# Patient Record
Sex: FEMALE | Race: OTHER | Employment: UNEMPLOYED | URBAN - METROPOLITAN AREA
[De-identification: names, ages, dates, MRNs, and addresses within clinical notes are randomized per-mention and may not be internally consistent; named-entity substitution may affect disease eponyms.]

---

## 2017-01-17 ENCOUNTER — OFFICE VISIT (OUTPATIENT)
Dept: PSYCHIATRY | Age: 27
End: 2017-01-17

## 2017-01-17 VITALS
DIASTOLIC BLOOD PRESSURE: 60 MMHG | BODY MASS INDEX: 25.58 KG/M2 | OXYGEN SATURATION: 98 % | HEART RATE: 79 BPM | SYSTOLIC BLOOD PRESSURE: 90 MMHG | WEIGHT: 163 LBS | HEIGHT: 67 IN

## 2017-01-17 DIAGNOSIS — F33.0 MILD EPISODE OF RECURRENT MAJOR DEPRESSIVE DISORDER (HCC): Primary | ICD-10-CM

## 2017-01-17 PROCEDURE — 99214 OFFICE O/P EST MOD 30 MIN: CPT | Performed by: PSYCHIATRY & NEUROLOGY

## 2017-01-17 ASSESSMENT — ENCOUNTER SYMPTOMS
RESPIRATORY NEGATIVE: 1
EYES NEGATIVE: 1
GASTROINTESTINAL NEGATIVE: 1

## 2017-08-29 ENCOUNTER — OFFICE VISIT (OUTPATIENT)
Dept: PSYCHIATRY | Age: 27
End: 2017-08-29

## 2017-08-29 VITALS
DIASTOLIC BLOOD PRESSURE: 60 MMHG | WEIGHT: 159 LBS | BODY MASS INDEX: 24.96 KG/M2 | OXYGEN SATURATION: 99 % | HEART RATE: 55 BPM | SYSTOLIC BLOOD PRESSURE: 120 MMHG | HEIGHT: 67 IN

## 2017-08-29 DIAGNOSIS — F12.10 CANNABIS ABUSE: ICD-10-CM

## 2017-08-29 DIAGNOSIS — F33.0 MILD EPISODE OF RECURRENT MAJOR DEPRESSIVE DISORDER (HCC): Primary | ICD-10-CM

## 2017-08-29 PROCEDURE — 99214 OFFICE O/P EST MOD 30 MIN: CPT | Performed by: PSYCHIATRY & NEUROLOGY

## 2017-08-29 RX ORDER — ESCITALOPRAM OXALATE 10 MG/1
10 TABLET ORAL DAILY
Qty: 30 TABLET | Refills: 2 | Status: SHIPPED | OUTPATIENT
Start: 2017-08-29 | End: 2017-09-26

## 2017-08-30 ASSESSMENT — ENCOUNTER SYMPTOMS
GASTROINTESTINAL NEGATIVE: 1
RESPIRATORY NEGATIVE: 1
EYES NEGATIVE: 1

## 2017-09-26 ENCOUNTER — OFFICE VISIT (OUTPATIENT)
Dept: PSYCHIATRY | Age: 27
End: 2017-09-26

## 2017-09-26 VITALS
SYSTOLIC BLOOD PRESSURE: 110 MMHG | WEIGHT: 162 LBS | OXYGEN SATURATION: 98 % | HEIGHT: 67 IN | HEART RATE: 85 BPM | BODY MASS INDEX: 25.43 KG/M2 | DIASTOLIC BLOOD PRESSURE: 80 MMHG

## 2017-09-26 DIAGNOSIS — F33.0 MILD EPISODE OF RECURRENT MAJOR DEPRESSIVE DISORDER (HCC): Primary | ICD-10-CM

## 2017-09-26 PROCEDURE — 99213 OFFICE O/P EST LOW 20 MIN: CPT | Performed by: PSYCHIATRY & NEUROLOGY

## 2017-09-26 RX ORDER — MIRTAZAPINE 15 MG/1
15 TABLET, FILM COATED ORAL NIGHTLY
Qty: 30 TABLET | Refills: 2 | Status: SHIPPED | OUTPATIENT
Start: 2017-09-26 | End: 2017-10-24 | Stop reason: SDUPTHER

## 2017-09-26 ASSESSMENT — ENCOUNTER SYMPTOMS
RESPIRATORY NEGATIVE: 1
EYES NEGATIVE: 1
GASTROINTESTINAL NEGATIVE: 1

## 2017-09-29 ENCOUNTER — OFFICE VISIT (OUTPATIENT)
Dept: PRIMARY CARE CLINIC | Age: 27
End: 2017-09-29

## 2017-09-29 VITALS — BODY MASS INDEX: 24.75 KG/M2 | DIASTOLIC BLOOD PRESSURE: 68 MMHG | SYSTOLIC BLOOD PRESSURE: 110 MMHG | WEIGHT: 158 LBS

## 2017-09-29 DIAGNOSIS — J45.20 MILD INTERMITTENT ASTHMA WITHOUT COMPLICATION: ICD-10-CM

## 2017-09-29 DIAGNOSIS — J30.89 ALLERGIC RHINITIS CAUSED BY MOLD: ICD-10-CM

## 2017-09-29 DIAGNOSIS — S00.81XA ABRASION OF FOREHEAD, INITIAL ENCOUNTER: Primary | ICD-10-CM

## 2017-09-29 DIAGNOSIS — Z23 FLU VACCINE NEED: ICD-10-CM

## 2017-09-29 PROCEDURE — 99214 OFFICE O/P EST MOD 30 MIN: CPT | Performed by: INTERNAL MEDICINE

## 2017-09-29 PROCEDURE — 90471 IMMUNIZATION ADMIN: CPT | Performed by: INTERNAL MEDICINE

## 2017-09-29 PROCEDURE — 90686 IIV4 VACC NO PRSV 0.5 ML IM: CPT | Performed by: INTERNAL MEDICINE

## 2017-09-29 RX ORDER — FLUTICASONE PROPIONATE 50 MCG
SPRAY, SUSPENSION (ML) NASAL
Qty: 1 BOTTLE | Refills: 11 | Status: SHIPPED | OUTPATIENT
Start: 2017-09-29

## 2017-09-29 RX ORDER — ALBUTEROL SULFATE 90 UG/1
2 AEROSOL, METERED RESPIRATORY (INHALATION) EVERY 6 HOURS PRN
Qty: 1 INHALER | Refills: 3 | Status: SHIPPED | OUTPATIENT
Start: 2017-09-29

## 2017-09-29 RX ORDER — LORATADINE 10 MG/1
10 TABLET ORAL DAILY
Qty: 30 TABLET | Refills: 11 | Status: SHIPPED | OUTPATIENT
Start: 2017-09-29

## 2017-09-29 RX ORDER — INHALER, ASSIST DEVICES
SPACER (EA) MISCELLANEOUS
Qty: 1 EACH | Refills: 0 | Status: SHIPPED | OUTPATIENT
Start: 2017-09-29 | End: 2018-02-02

## 2017-10-05 ENCOUNTER — HOSPITAL ENCOUNTER (OUTPATIENT)
Dept: PHYSICAL THERAPY | Facility: MEDICAL CENTER | Age: 27
Discharge: HOME | End: 2017-10-31
Admitting: ORTHOPAEDIC SURGERY

## 2017-10-05 NOTE — FLOWSHEET NOTE
[facility]  Orthopaedics and Sports Rehabilitation, [facility]    Physical Therapy Daily Treatment Note  Date:  10/5/2017    Patient Name:  Chinyere Vilchis    :  1990  MRN: [id number]  Restrictions/Precautions:    Medical/Treatment Diagnosis Information:  · Diagnosis: S83.014A lateral dislocation of right patella, initial encounter  · Treatment Diagnosis: M25.561 right knee pain  Insurance/Certification information:  PT Insurance Information: [employer] [employer]/$0 copay/30 vpcy/no auth  Physician Information:  Referring Practitioner: Baldemar Candelario  Plan of care signed (Y/N):     Date of Patient follow up with Physician:     G-Code (if applicable):      Date G-Code Applied:  10/5/17  PT G-Codes  Functional Assessment Tool Used: LEFS  Score: 4% LOF  Functional Limitation: Mobility: Walking and moving around  Mobility: Walking and Moving Around Current Status ():  At least 1 percent but less than 20 percent impaired, limited or restricted  Mobility: Walking and Moving Around Goal Status (): 0 percent impaired, limited or restricted    Progress Note: [x]  Yes  []  No  Next due by: 17     Latex Allergy:  [x]NO      []YES  Preferred Language for Healthcare:   [x]English       []other:    Visit # Insurance Allowable   1 30     Pain level:  0/10     SUBJECTIVE:  See eval    OBJECTIVE: See eval  Observation:   Test measurements:      RESTRICTIONS/PRECAUTIONS: none    Exercises/Interventions:     Therapeutic Ex Sets/sec Reps Notes   eliptical 8'     SLR 4-way 5lbs/6lbs 3 10    SLR (+) 30\" 5                                                                                  Manual Intervention                                          NMR re-education                                                                Therapeutic Exercise and NMR EXR  [x] (56216) Provided verbal/tactile cueing for activities related to strengthening, flexibility, endurance, ROM for improvements in LE, proximal hip, and 2:50-3:54  59'     [x] EVAL  [x] GK(77060) x  2   [] IONTO  [] NMR (12832) x      [] VASO  [] Manual (38256) x       [] Other:  [] TA x       [] Mech Traction (90327)  [] ES(attended) (34928)      [] ES (un) (34822):     GOALS: Short Term Goals: To be achieved in: 2 weeks  1. Independent in HEP and progression per patient tolerance, in order to prevent re-injury. 2. Patient will have a decrease in pain to facilitate improvement in movement, function, and ADLs as indicated by Functional Deficits.     Long Term Goals: To be achieved in: 6 weeks  1. Disability index score of 0% for the LEFS to assist with reaching prior level of function. 2. Patient will demonstrate an increase in Strength to 5/5 to allow for proper functional mobility as indicated by patients Functional Deficits. 3. Patient will return to all functional activities without increased symptoms or restriction. 4. Patient will return to all dancing activities without pain                    Progression Towards Functional goals:  [] Patient is progressing as expected towards functional goals listed. [] Progression is slowed due to complexities listed. [] Progression has been slowed due to co-morbidities.   [x] Plan just implemented, too soon to assess goals progression  [] Other:     ASSESSMENT:  See eval    Treatment/Activity Tolerance:  [] Patient tolerated treatment well [] Patient limited by fatique  [] Patient limited by pain  [] Patient limited by other medical complications  [] Other:     Prognosis: [x] Good [] Fair  [] Poor    Patient Requires Follow-up: [x] Yes  [] No    PLAN: See eval  [] Continue per plan of care [] Alter current plan (see comments)  [x] Plan of care initiated [] Hold pending MD visit [] Discharge    Add SLR (+), wall sits, hamstring curls, lateral walk bands    Electronically signed by: Drake Ashton PT

## 2017-10-10 ENCOUNTER — HOSPITAL ENCOUNTER (OUTPATIENT)
Dept: PHYSICAL THERAPY | Facility: MEDICAL CENTER | Age: 27
Discharge: HOME | End: 2017-10-11
Admitting: ORTHOPAEDIC SURGERY

## 2017-10-17 ENCOUNTER — HOSPITAL ENCOUNTER (OUTPATIENT)
Dept: PHYSICAL THERAPY | Facility: MEDICAL CENTER | Age: 27
Discharge: HOME | End: 2017-10-18
Admitting: ORTHOPAEDIC SURGERY

## 2017-10-24 ENCOUNTER — OFFICE VISIT (OUTPATIENT)
Dept: PSYCHIATRY | Age: 27
End: 2017-10-24

## 2017-10-24 VITALS
SYSTOLIC BLOOD PRESSURE: 90 MMHG | WEIGHT: 169 LBS | OXYGEN SATURATION: 98 % | BODY MASS INDEX: 26.47 KG/M2 | DIASTOLIC BLOOD PRESSURE: 60 MMHG | HEART RATE: 78 BPM

## 2017-10-24 DIAGNOSIS — F39 MOOD DISORDER (HCC): Primary | ICD-10-CM

## 2017-10-24 DIAGNOSIS — F12.10 CANNABIS ABUSE: ICD-10-CM

## 2017-10-24 DIAGNOSIS — F60.3 BORDERLINE PERSONALITY DISORDER (HCC): ICD-10-CM

## 2017-10-24 PROCEDURE — 99214 OFFICE O/P EST MOD 30 MIN: CPT | Performed by: PSYCHIATRY & NEUROLOGY

## 2017-10-24 PROCEDURE — G8419 CALC BMI OUT NRM PARAM NOF/U: HCPCS | Performed by: PSYCHIATRY & NEUROLOGY

## 2017-10-24 PROCEDURE — G8427 DOCREV CUR MEDS BY ELIG CLIN: HCPCS | Performed by: PSYCHIATRY & NEUROLOGY

## 2017-10-24 PROCEDURE — 1036F TOBACCO NON-USER: CPT | Performed by: PSYCHIATRY & NEUROLOGY

## 2017-10-24 PROCEDURE — G8484 FLU IMMUNIZE NO ADMIN: HCPCS | Performed by: PSYCHIATRY & NEUROLOGY

## 2017-10-24 RX ORDER — MIRTAZAPINE 15 MG/1
15 TABLET, FILM COATED ORAL NIGHTLY
Qty: 30 TABLET | Refills: 3 | Status: SHIPPED | OUTPATIENT
Start: 2017-10-24 | End: 2018-03-05 | Stop reason: SDUPTHER

## 2017-10-24 ASSESSMENT — ENCOUNTER SYMPTOMS
GASTROINTESTINAL NEGATIVE: 1
RESPIRATORY NEGATIVE: 1
EYES NEGATIVE: 1

## 2017-10-24 NOTE — PROGRESS NOTES
Outpatient Psychiatric Progress Note  Pratima Narayan M.D. Date: 10/24/2017    Total Duration of Visit: 15min    Vital Signs:   BP 90/60   Pulse 78   Wt 169 lb (76.7 kg)   SpO2 98%   BMI 26.47 kg/m²     Chief Complaint/Reason For Visit:   No chief complaint on file. Interval History: Doing well on remeron. Getting good sleep and this helps to remind her to take it. Has gained some weight back and feels better. Still working out and now going to PT. 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 Mood OK. Initially irritable when started remeron but this subsided. No morning sedation. Handing stress better along with using skills. Less sensitive to rejection. Attending therapy regularly. Ex  not living with her but providing money to help support child. No SI/HI. Less anxiety. MSE:     Appropriate grooming and hygiene. Appears stated age. Avoidant eye contact. Speech spontaneous and non-pressured. Mood better  Affect congruent. tearful  TP linear. TC free HI, no evidence of IOR/IOP. Passive SI, no plan  Pt denies any perceptual abnormalities. The pt did not exhibit abnormal movements or tremor. The pt was not restless or agitated. The pt was alert and oriented in all spheres. Pt demonstrated good fund of knowledge and good recall of recent and remote events. The patient demonstrated an appropriate level of insight into their diagnosis and treatment recommendations, including risks, benefits and alternatives of the recommended medications and therapy interventions. Review of Systems   Constitutional: Negative. HENT: Negative. Eyes: Negative. Respiratory: Negative. Cardiovascular: Negative. Gastrointestinal: Negative. Genitourinary: Negative. Musculoskeletal: Positive for joint pain. Skin: Negative. Neurological: Negative. Endo/Heme/Allergies: Negative. Psychiatric/Behavioral: Positive for depression.          Current Medications: Current Outpatient Prescriptions   Medication Sig Dispense Refill    mirtazapine (REMERON) 15 MG tablet Take 1 tablet by mouth nightly 30 tablet 3    fluticasone (FLONASE) 50 MCG/ACT nasal spray 2 sprays each nostril daily 1 Bottle 11    loratadine (CLARITIN) 10 MG tablet Take 1 tablet by mouth daily 30 tablet 11    Spacer/Aero-Holding Chambers (AEROCHAMBER MV) MISC Use as directed 1 each 0    albuterol sulfate HFA (VENTOLIN HFA) 108 (90 Base) MCG/ACT inhaler Inhale 2 puffs into the lungs every 6 hours as needed for Wheezing 1 Inhaler 3    Elastic Bandages & Supports (KNEE IMMOBILIZER 22\") MISC 1 Units by Does not apply route continuous 1 each 0    naproxen (NAPROSYN) 500 MG tablet Take 1 tablet by mouth 2 times daily as needed for Pain 14 tablet 0    etonogestrel-ethinyl estradiol (NUVARING) 0.12-0.015 MG/24HR vaginal ring Place 1 each vaginally every 21 days Insert one (1) ring vaginally and leave in place for three (3) weeks, then remove for one (1) week. 3 each 5     No current facility-administered medications for this visit. Labs/Imaging/EKG:    No visits with results within 6 Month(s) from this visit. Latest known visit with results is:   Admission on 01/22/2017, Discharged on 01/22/2017   Component Date Value Ref Range Status    Trichomonas Prep 01/22/2017 None Seen   Final    Yeast, Wet Prep 01/22/2017 1+*  Final    Clue Cells, Wet Prep 01/22/2017 None Seen   Final    WBC, Wet Prep 01/22/2017 2+   Final    RBC, Wet Prep 01/22/2017 <1+   Final    Epi Cells 01/22/2017 3+   Final    Bacteria 01/22/2017 4+   Final    Source Wet Prep 01/22/2017 Vaginal   Final    C. Trachomatis Amplified 01/24/2017    Final                    Value:Negative  A negative result does not preclude infection because results are  dependant on adequate specimen collection, abscence of inhibitors and  sufficient DNA to be detected.  N.  Gonorrhoeae Amplified 01/24/2017    Final

## 2017-10-27 ENCOUNTER — HOSPITAL ENCOUNTER (OUTPATIENT)
Dept: PHYSICAL THERAPY | Facility: MEDICAL CENTER | Age: 27
Discharge: HOME | End: 2017-10-28
Admitting: ORTHOPAEDIC SURGERY

## 2017-11-01 ENCOUNTER — HOSPITAL ENCOUNTER (OUTPATIENT)
Dept: OTHER | Age: 27
Discharge: HOME | End: 2017-11-30
Attending: ORTHOPAEDIC SURGERY | Admitting: ORTHOPAEDIC SURGERY

## 2017-11-02 ENCOUNTER — TELEPHONE (OUTPATIENT)
Dept: PRIMARY CARE CLINIC | Age: 27
End: 2017-11-02

## 2017-11-02 NOTE — TELEPHONE ENCOUNTER
Patient would like an increase on her medication, patient eating 3000 calories a day, the appetite thing has gotten out of control

## 2017-11-03 ENCOUNTER — HOSPITAL ENCOUNTER (OUTPATIENT)
Dept: PHYSICAL THERAPY | Facility: MEDICAL CENTER | Age: 27
Discharge: HOME | End: 2017-11-04
Admitting: ORTHOPAEDIC SURGERY

## 2017-11-10 ENCOUNTER — HOSPITAL ENCOUNTER (OUTPATIENT)
Dept: PHYSICAL THERAPY | Facility: MEDICAL CENTER | Age: 27
Discharge: HOME | End: 2017-11-11
Admitting: ORTHOPAEDIC SURGERY

## 2017-11-16 ENCOUNTER — HOSPITAL ENCOUNTER (OUTPATIENT)
Dept: PHYSICAL THERAPY | Facility: MEDICAL CENTER | Age: 27
Discharge: HOME | End: 2017-11-17
Admitting: ORTHOPAEDIC SURGERY

## 2017-11-21 ENCOUNTER — HOSPITAL ENCOUNTER (OUTPATIENT)
Dept: PHYSICAL THERAPY | Facility: MEDICAL CENTER | Age: 27
Discharge: HOME | End: 2017-11-22
Admitting: ORTHOPAEDIC SURGERY

## 2017-12-01 ENCOUNTER — HOSPITAL ENCOUNTER (OUTPATIENT)
Dept: OTHER | Age: 27
Discharge: HOME | End: 2017-12-31
Attending: ORTHOPAEDIC SURGERY | Admitting: ORTHOPAEDIC SURGERY

## 2017-12-07 ENCOUNTER — HOSPITAL ENCOUNTER (OUTPATIENT)
Dept: PHYSICAL THERAPY | Facility: MEDICAL CENTER | Age: 27
Discharge: HOME | End: 2017-12-08
Admitting: ORTHOPAEDIC SURGERY

## 2018-01-01 ENCOUNTER — HOSPITAL ENCOUNTER (OUTPATIENT)
Dept: OTHER | Age: 28
Discharge: HOME | End: 2018-01-31
Attending: ORTHOPAEDIC SURGERY | Admitting: ORTHOPAEDIC SURGERY

## 2018-01-03 ENCOUNTER — HOSPITAL ENCOUNTER (OUTPATIENT)
Dept: PHYSICAL THERAPY | Facility: MEDICAL CENTER | Age: 28
Discharge: HOME | End: 2018-01-04
Admitting: ORTHOPAEDIC SURGERY

## 2018-01-03 NOTE — DISCHARGE SUMMARY
[facility]       [facility] Discharge Summary    Dear  Dr Josué Glasgow,    We had the pleasure of treating the following patient for physical therapy services at [address] [place] [address]. A summary of our findings can be found in the discharge summary below. If you have any questions or concerns regarding these findings, please do not hesitate to contact me at the office phone number above. Thank you for the referral.     Physician Signature:________________________________Date:__________________  By signing above (or electronic signature), therapists plan is approved by physician      Overall Response to Treatment:   []Patient is responding well to treatment and improvement is noted with regards  to goals   [x]Patient should continue to improve in reasonable time if they continue HEP   []Patient has plateaued and is no longer responding to skilled PT intervention    []Patient is getting worse and would benefit from return to referring MD   []Patient unable to adhere to initial POC   [x]Other: Pt has reached maximum benefit from skilled PT. Overall, pt notes less bouts of instability, however does still experience this occasionally. Recommend d/c to HEP. F/u with PT/MD prn.     Date range of Visits: 10/5/17-1/3/18  Total Visits: 9    Physical Therapy Daily Treatment Note  Date:  1/3/2018    Patient Name:  Reginaldo Jeffery    :  1990  MRN: [id number]  Restrictions/Precautions:    Medical/Treatment Diagnosis Information:  · Diagnosis: S83.014A lateral dislocation of right patella, initial encounter  · Treatment Diagnosis: M25.561 right knee pain  Insurance/Certification information:  PT Insurance Information: [place] [employer]/$0 copay/30 vpcy/no auth  Physician Information:  Referring Practitioner: Nya Lowe  Plan of care signed (Y/N):     Date of Patient follow up with Physician:     G-Code (if applicable):      Date MET  4. Patient will return to all dancing activities without pain  NOT MET    Progression Towards Functional goals:  [x] Patient is progressing as expected towards functional goals listed. [] Progression is slowed due to complexities listed. [] Progression has been slowed due to co-morbidities. [] Plan just implemented, too soon to assess goals progression  [] Other:     ASSESSMENT:      Treatment/Activity Tolerance:  [x] Patient tolerated treatment well [] Patient limited by fatique  [] Patient limited by pain  [] Patient limited by other medical complications  [x] Other: No complaints with today's program.  Minimal edema noted upon examination. Pt continues to demonstrate lateral patellar instability at rest.  Pt was taught how to independently kinesiotape her knee for patellar stability. Pt demonstrates independence with current program.  Pt has reached maximum medical benefit from PT. PT advised pt on appropriate gym program progressions. Pt no longer requires skilled PT. Recommend d/c to HEP. F/u with PT prn. Prognosis: [x] Good [] Fair  [] Poor    Patient Requires Follow-up: [] Yes  [x] No    PLAN:   [] Continue per plan of care [] Alter current plan (see comments)  [] Plan of care initiated [] Hold pending MD visit [x] Discharge    D/c to HEP. F/u with PT prn.     Electronically signed by: Marilu Aguiar PT

## 2018-02-01 ENCOUNTER — HOSPITAL ENCOUNTER (OUTPATIENT)
Dept: OTHER | Age: 28
Discharge: HOME | End: 2018-02-28
Attending: ORTHOPAEDIC SURGERY | Admitting: ORTHOPAEDIC SURGERY

## 2018-03-01 ENCOUNTER — TELEPHONE (OUTPATIENT)
Dept: PSYCHIATRY | Age: 28
End: 2018-03-01

## 2018-03-05 RX ORDER — MIRTAZAPINE 15 MG/1
30 TABLET, FILM COATED ORAL NIGHTLY
Qty: 30 TABLET | Refills: 3 | Status: SHIPPED | OUTPATIENT
Start: 2018-03-05 | End: 2018-03-20

## 2018-03-05 NOTE — TELEPHONE ENCOUNTER
Sent Remeron 30mg HS to her pharmacy. Still monitor weight. Higher doses generally have less weight gain but some people still continue to gain.  thanks

## 2018-03-20 ENCOUNTER — INITIAL CONSULT (OUTPATIENT)
Dept: PSYCHIATRY | Age: 28
End: 2018-03-20

## 2018-03-20 ENCOUNTER — TELEPHONE (OUTPATIENT)
Dept: PRIMARY CARE CLINIC | Age: 28
End: 2018-03-20

## 2018-03-20 VITALS — SYSTOLIC BLOOD PRESSURE: 118 MMHG | WEIGHT: 183.6 LBS | DIASTOLIC BLOOD PRESSURE: 62 MMHG | BODY MASS INDEX: 28.76 KG/M2

## 2018-03-20 DIAGNOSIS — F33.0 MILD EPISODE OF RECURRENT MAJOR DEPRESSIVE DISORDER (HCC): Primary | ICD-10-CM

## 2018-03-20 PROCEDURE — 99213 OFFICE O/P EST LOW 20 MIN: CPT | Performed by: PSYCHIATRY & NEUROLOGY

## 2018-03-20 RX ORDER — MIRTAZAPINE 30 MG/1
30 TABLET, FILM COATED ORAL NIGHTLY
Qty: 30 TABLET | Refills: 2 | Status: SHIPPED | OUTPATIENT
Start: 2018-03-20 | End: 2018-04-24

## 2018-03-20 ASSESSMENT — ENCOUNTER SYMPTOMS
RESPIRATORY NEGATIVE: 1
GASTROINTESTINAL NEGATIVE: 1
EYES NEGATIVE: 1

## 2018-03-20 NOTE — PROGRESS NOTES
Outpatient Psychiatric Progress Note  Jennifer Cabrales M.D. Date: 3/20/2018    Total Duration of Visit: 15min    Vital Signs:   /62   Wt 183 lb 9.6 oz (83.3 kg)   BMI 28.76 kg/m²     Chief Complaint/Reason For Visit:   Chief Complaint   Patient presents with    Other        Interval History: Increased dose of Remeron 2-3 weeks ago. Mostly because of insatiable appetite, but with increase has also noted improvement in mood. Attending DBT individual and skills group. Cut off contact from BF. 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 Depressive sxs: less appetite (med related), good sleep, less overwhelmed by stressors yet still emotional when appropriate, No SI since increase (was having passive before). No HI. Energy good. No anhedonia. Anxiety sxs: Denies: worries, panic, disrupted sleep, irritability, restless or fatigue. MSE:     Appropriate grooming and hygiene. Appears stated age. Avoidant eye contact. Speech spontaneous and non-pressured. Mood better  Affect congruent. tearful  TP linear. TC free HI, no evidence of IOR/IOP. Passive SI, no plan  Pt denies any perceptual abnormalities. The pt did not exhibit abnormal movements or tremor. The pt was not restless or agitated. The pt was alert and oriented in all spheres. Pt demonstrated good fund of knowledge and good recall of recent and remote events. The patient demonstrated an appropriate level of insight into their diagnosis and treatment recommendations, including risks, benefits and alternatives of the recommended medications and therapy interventions. Review of Systems   Constitutional: Negative. HENT: Negative. Eyes: Negative. Respiratory: Negative. Cardiovascular: Negative. Gastrointestinal: Negative. Genitourinary: Negative. Skin: Negative. Neurological: Negative. Endo/Heme/Allergies: Negative.

## 2018-03-20 NOTE — TELEPHONE ENCOUNTER
Pt left voice mail, would like orders to have either a DEXA or Body composition test  She is curious of lean body mass vs fat body mass  please  Call pt

## 2018-03-27 ENCOUNTER — OFFICE VISIT (OUTPATIENT)
Dept: PRIMARY CARE CLINIC | Age: 28
End: 2018-03-27

## 2018-03-27 VITALS
DIASTOLIC BLOOD PRESSURE: 70 MMHG | BODY MASS INDEX: 28.41 KG/M2 | HEIGHT: 67 IN | SYSTOLIC BLOOD PRESSURE: 118 MMHG | WEIGHT: 181 LBS

## 2018-03-27 DIAGNOSIS — J45.20 MILD INTERMITTENT ASTHMA WITHOUT COMPLICATION: Primary | ICD-10-CM

## 2018-03-27 PROCEDURE — G8419 CALC BMI OUT NRM PARAM NOF/U: HCPCS | Performed by: INTERNAL MEDICINE

## 2018-03-27 PROCEDURE — G8482 FLU IMMUNIZE ORDER/ADMIN: HCPCS | Performed by: INTERNAL MEDICINE

## 2018-03-27 PROCEDURE — 1036F TOBACCO NON-USER: CPT | Performed by: INTERNAL MEDICINE

## 2018-03-27 PROCEDURE — G8427 DOCREV CUR MEDS BY ELIG CLIN: HCPCS | Performed by: INTERNAL MEDICINE

## 2018-03-27 PROCEDURE — 99214 OFFICE O/P EST MOD 30 MIN: CPT | Performed by: INTERNAL MEDICINE

## 2018-04-24 ENCOUNTER — INITIAL CONSULT (OUTPATIENT)
Dept: PSYCHIATRY | Age: 28
End: 2018-04-24

## 2018-04-24 VITALS
DIASTOLIC BLOOD PRESSURE: 70 MMHG | OXYGEN SATURATION: 98 % | BODY MASS INDEX: 27.88 KG/M2 | HEART RATE: 83 BPM | SYSTOLIC BLOOD PRESSURE: 110 MMHG | WEIGHT: 178 LBS

## 2018-04-24 DIAGNOSIS — F12.10 CANNABIS ABUSE: ICD-10-CM

## 2018-04-24 DIAGNOSIS — F33.0 MILD EPISODE OF RECURRENT MAJOR DEPRESSIVE DISORDER (HCC): Primary | ICD-10-CM

## 2018-04-24 PROCEDURE — 99214 OFFICE O/P EST MOD 30 MIN: CPT | Performed by: PSYCHIATRY & NEUROLOGY

## 2018-04-24 RX ORDER — MIRTAZAPINE 45 MG/1
45 TABLET, FILM COATED ORAL NIGHTLY
Qty: 30 TABLET | Refills: 3 | Status: SHIPPED | OUTPATIENT
Start: 2018-04-24 | End: 2018-07-24 | Stop reason: SDUPTHER

## 2018-04-24 ASSESSMENT — ENCOUNTER SYMPTOMS
GASTROINTESTINAL NEGATIVE: 1
RESPIRATORY NEGATIVE: 1
EYES NEGATIVE: 1

## 2018-07-24 ENCOUNTER — INITIAL CONSULT (OUTPATIENT)
Dept: PSYCHIATRY | Age: 28
End: 2018-07-24

## 2018-07-24 VITALS — WEIGHT: 178 LBS | BODY MASS INDEX: 27.88 KG/M2 | SYSTOLIC BLOOD PRESSURE: 106 MMHG | DIASTOLIC BLOOD PRESSURE: 62 MMHG

## 2018-07-24 DIAGNOSIS — F33.0 MILD EPISODE OF RECURRENT MAJOR DEPRESSIVE DISORDER (HCC): ICD-10-CM

## 2018-07-24 DIAGNOSIS — F12.10 CANNABIS ABUSE: Primary | ICD-10-CM

## 2018-07-24 PROCEDURE — 99213 OFFICE O/P EST LOW 20 MIN: CPT | Performed by: PSYCHIATRY & NEUROLOGY

## 2018-07-24 PROCEDURE — G8419 CALC BMI OUT NRM PARAM NOF/U: HCPCS | Performed by: PSYCHIATRY & NEUROLOGY

## 2018-07-24 PROCEDURE — 1036F TOBACCO NON-USER: CPT | Performed by: PSYCHIATRY & NEUROLOGY

## 2018-07-24 PROCEDURE — G8427 DOCREV CUR MEDS BY ELIG CLIN: HCPCS | Performed by: PSYCHIATRY & NEUROLOGY

## 2018-07-24 RX ORDER — MIRTAZAPINE 45 MG/1
45 TABLET, FILM COATED ORAL NIGHTLY
Qty: 30 TABLET | Refills: 3 | Status: SHIPPED | OUTPATIENT
Start: 2018-07-24 | End: 2018-09-25 | Stop reason: SDUPTHER

## 2018-07-24 RX ORDER — ARIPIPRAZOLE 2 MG/1
2 TABLET ORAL DAILY
Qty: 30 TABLET | Refills: 3 | Status: SHIPPED | OUTPATIENT
Start: 2018-07-24 | End: 2018-09-25 | Stop reason: SDUPTHER

## 2018-07-24 ASSESSMENT — ENCOUNTER SYMPTOMS
RESPIRATORY NEGATIVE: 1
GASTROINTESTINAL NEGATIVE: 1
EYES NEGATIVE: 1

## 2018-07-24 NOTE — PROGRESS NOTES
Final    Epi Cells 02/02/2018 5-10  /HPF Final    Bacteria, UA 02/02/2018 1+* /HPF Final    Trichomonas, UA 02/02/2018 None Seen  /HPF Final         Assessment:  MDD, mild  r/o BPD  Cannabis abuse    Recommendations:  1. MDD/ BPD:   Continue Remeron to 45 mg daily. Reviewed R/B/SE  Start abilify 2mg daily. Reviewed R/B/SE. Pt has taken in past as samples  Continue therapy and DBT skills. No safety concerns  F/U in 1 month  2. Cannabis abuse: Recommending abstinence. Educated pt about use with anxiety/ mood and medications. If pt unable to reduce/ stop on her own would refer to substance treatment.

## 2018-08-30 ENCOUNTER — TELEPHONE (OUTPATIENT)
Dept: PRIMARY CARE CLINIC | Age: 28
End: 2018-08-30

## 2018-09-25 ENCOUNTER — INITIAL CONSULT (OUTPATIENT)
Dept: PSYCHIATRY | Age: 28
End: 2018-09-25
Payer: MEDICARE

## 2018-09-25 VITALS — SYSTOLIC BLOOD PRESSURE: 116 MMHG | DIASTOLIC BLOOD PRESSURE: 70 MMHG | BODY MASS INDEX: 26.85 KG/M2 | WEIGHT: 171.4 LBS

## 2018-09-25 DIAGNOSIS — F33.0 MILD EPISODE OF RECURRENT MAJOR DEPRESSIVE DISORDER (HCC): ICD-10-CM

## 2018-09-25 PROCEDURE — 99213 OFFICE O/P EST LOW 20 MIN: CPT | Performed by: PSYCHIATRY & NEUROLOGY

## 2018-09-25 RX ORDER — ARIPIPRAZOLE 2 MG/1
2 TABLET ORAL DAILY
Qty: 30 TABLET | Refills: 5 | Status: SHIPPED | OUTPATIENT
Start: 2018-09-25

## 2018-09-25 RX ORDER — MIRTAZAPINE 45 MG/1
45 TABLET, FILM COATED ORAL NIGHTLY
Qty: 30 TABLET | Refills: 5 | Status: SHIPPED | OUTPATIENT
Start: 2018-09-25

## 2018-09-25 ASSESSMENT — ENCOUNTER SYMPTOMS
EYES NEGATIVE: 1
RESPIRATORY NEGATIVE: 1
GASTROINTESTINAL NEGATIVE: 1

## 2018-09-25 NOTE — PROGRESS NOTES
02/02/2018 color interfere  5.0 - 8.0 Final    Protein, UA 02/02/2018 color interfere  Negative mg/dL Final    Urobilinogen, Urine 02/02/2018 color interefer  <2.0 E.U./dL Final    Nitrite, Urine 02/02/2018 color interfere  Negative Final    Leukocyte Esterase, Urine 02/02/2018 color interfere  Negative Final    Microscopic Examination 02/02/2018 YES   Final    Urine Type 02/02/2018 Not Specified   Final    Mucus, UA 02/02/2018 Rare* /LPF Final    WBC, UA 02/02/2018 * 0 - 5 /HPF Final    RBC, UA 02/02/2018 20-50* 0 - 2 /HPF Final    Epi Cells 02/02/2018 5-10  /HPF Final    Bacteria, UA 02/02/2018 1+* /HPF Final    Trichomonas, UA 02/02/2018 None Seen  /HPF Final         Assessment:  MDD, mild  r/o BPD  Cannabis abuse    Recommendations:  1. MDD/ BPD:   Continue Remeron to 45 mg daily. Reviewed R/B/SE  Continue abilify 2mg daily. Reviewed R/B/SE. (Can increase in future to 5mg daily)  Continue therapy and DBT skills. No safety concerns  F/U up with Dr. Alberta Peoples  2. Cannabis abuse: Recommending abstinence. Educated pt about use with anxiety/ mood and medications. If pt unable to reduce/ stop on her own would refer to substance treatment.

## 2019-12-02 NOTE — PATIENT INSTRUCTIONS
Taylor Oconnell [id number] [facility],[address], [place], [address]   Phone: [phone]
DISPLAY PLAN FREE TEXT

## 2024-01-01 NOTE — PROGRESS NOTES
daily 1 Bottle 11    loratadine (CLARITIN) 10 MG tablet Take 1 tablet by mouth daily 30 tablet 11    albuterol sulfate HFA (VENTOLIN HFA) 108 (90 Base) MCG/ACT inhaler Inhale 2 puffs into the lungs every 6 hours as needed for Wheezing 1 Inhaler 3    etonogestrel-ethinyl estradiol (NUVARING) 0.12-0.015 MG/24HR vaginal ring Place 1 each vaginally every 21 days Insert one (1) ring vaginally and leave in place for three (3) weeks, then remove for one (1) week. 3 each 5     No current facility-administered medications on file prior to visit. Vitals:    03/27/18 1358   BP: 118/70         Wt Readings from Last 3 Encounters:   03/27/18 181 lb (82.1 kg)   03/20/18 183 lb 9.6 oz (83.3 kg)   02/02/18 187 lb 2.7 oz (84.9 kg)     BP Readings from Last 3 Encounters:   03/27/18 118/70   03/20/18 118/62   02/02/18 118/69         /70   Ht 5' 7\" (1.702 m)   Wt 181 lb (82.1 kg)   LMP 01/27/2018   BMI 28.35 kg/m²   General appearance: alert, appears stated age and cooperative  Eyes: conjunctivae/corneas clear. PERRL, EOM's intact. Fundi benign. Ears: normal TM's and external ear canals both ears  Nose: Nares normal. Septum midline. Mucosa normal. No drainage or sinus tenderness. Throat: lips, mucosa, and tongue normal; teeth and gums normal  Neck: no adenopathy and thyroid not enlarged, symmetric, no tenderness/mass/nodules  Lungs: clear to auscultation bilaterally  Heart: regular rate and rhythm, S1, S2 normal, no murmur, click, rub or gallop  Neurologic: Mental status: Alert, oriented, thought content appropriate  Cranial nerves: normal  Skin: Skin is warm and dry. [facility]: normal mood and affect. speech is normal and behavior is normal. Judgment, cognition and memory are normal.     not applicable    Assessment and Plan  1. Mild intermittent asthma without complication  Well controlled-continue current meds    2.  BMI 28.0-28.9,adult  Advised contacting sports medicine and DEXA not in routine use for clitoris and vaginal anatomy normal, absent significant discharge or tags; no masses; no hernias.